# Patient Record
Sex: MALE | Race: WHITE | NOT HISPANIC OR LATINO | Employment: FULL TIME | URBAN - METROPOLITAN AREA
[De-identification: names, ages, dates, MRNs, and addresses within clinical notes are randomized per-mention and may not be internally consistent; named-entity substitution may affect disease eponyms.]

---

## 2018-09-10 ENCOUNTER — OFFICE VISIT (OUTPATIENT)
Dept: URGENT CARE | Facility: CLINIC | Age: 53
End: 2018-09-10
Payer: COMMERCIAL

## 2018-09-10 VITALS
SYSTOLIC BLOOD PRESSURE: 108 MMHG | RESPIRATION RATE: 16 BRPM | OXYGEN SATURATION: 98 % | BODY MASS INDEX: 27.97 KG/M2 | TEMPERATURE: 97.8 F | HEART RATE: 87 BPM | HEIGHT: 67 IN | WEIGHT: 178.2 LBS | DIASTOLIC BLOOD PRESSURE: 60 MMHG

## 2018-09-10 DIAGNOSIS — S61.411A LACERATION OF RIGHT HAND WITHOUT FOREIGN BODY, INITIAL ENCOUNTER: Primary | ICD-10-CM

## 2018-09-10 PROCEDURE — 90471 IMMUNIZATION ADMIN: CPT

## 2018-09-10 PROCEDURE — 12002 RPR S/N/AX/GEN/TRNK2.6-7.5CM: CPT | Performed by: PHYSICIAN ASSISTANT

## 2018-09-10 PROCEDURE — 99214 OFFICE O/P EST MOD 30 MIN: CPT | Performed by: PHYSICIAN ASSISTANT

## 2018-09-10 PROCEDURE — 12020 TX SUPFC WND DEHSN SMPL CLSR: CPT | Performed by: PHYSICIAN ASSISTANT

## 2018-09-10 PROCEDURE — 90715 TDAP VACCINE 7 YRS/> IM: CPT

## 2018-09-10 RX ORDER — ASPIRIN 81 MG/1
81 TABLET ORAL DAILY
COMMUNITY

## 2018-09-10 RX ORDER — ATORVASTATIN CALCIUM 40 MG/1
40 TABLET, FILM COATED ORAL DAILY
COMMUNITY

## 2018-09-10 NOTE — PROGRESS NOTES
[facility]        NAME: Alexx Chowdary is a 48 y o  male  : 1965    MRN: [id number]  DATE: September 10, 2018  TIME: 6:33 PM    Assessment and Plan   Laceration of right hand without foreign body, initial encounter [S61 411A]  1  Laceration of right hand without foreign body, initial encounter  TDAP Vaccine greater than or equal to 6yo    Laceration repair     Patient Instructions   Keep skin clean using soap and water  Pat dry  Apply an antibiotic ointment such as Neosporin as desired  Keep the wound covered while performing dirty activities and working  When at home and in a clean environment you may leave open to dry  Monitor for signs of infection including increasing redness, swelling, pus drainage, streaking redness, fever, chills, and body aches  If the symptoms occur seek care immediately  Follow-up in 7-10 days to have sutures removed  Proceed to the ER if symptoms worsen  Care for sutures reviewed in length  All questions answered  Precautions given  Chief Complaint     Chief Complaint   Patient presents with    Laceration     Pt here for laceration to right hand,  pt states it happened 1 hour ago  Pt cut  it  with a Utility knife  Pt states last TD is unknown  History of Present Illness       79-year-old male presenting with complaint of right hand laceration x today  Patient notes laceration occurred roughly 2 hours ago when he accidentally cut himself with a utility knife  He states he washed the wound immediately using Betadine solution that he had at home  He did apply direct pressure and believes bleeding has stopped  He denies seeing any tendon involvement, noting ability to move all of his fingers  He denies any decreased sensation at the fingertips  He has had multiple lacerations in the past, none to this area  He notes he has attempted to suture himself in the past, but was unable to do so given location of this injury    He is unsure of the date of his last tetanus  He is otherwise feeling well with no fevers, chills, body aches, or recent infections  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, however, does a large amount of dirty activities at home  Review of Systems   Review of Systems   Constitutional: Negative for appetite change, chills, diaphoresis, fatigue and fever  Respiratory: Negative for cough, shortness of breath and wheezing  Cardiovascular: Negative for chest pain and palpitations  Gastrointestinal: Negative for abdominal pain, diarrhea, nausea and vomiting  Musculoskeletal: Negative for arthralgias, joint swelling and myalgias  Skin: Positive for wound  Negative for color change and rash  Neurological: Negative for dizziness, weakness, light-headedness, numbness and headaches  Current Medications       Current Outpatient Prescriptions:     aspirin (ECOTRIN LOW STRENGTH) 81 mg EC tablet, Take 81 mg by mouth daily, Disp: , Rfl:     atorvastatin (LIPITOR) 40 mg tablet, Take 40 mg by mouth daily, Disp: , Rfl:     Current Allergies     Allergies as of 09/10/2018 - Reviewed 09/10/2018   Allergen Reaction Noted    Penicillins Other (See Comments) 09/10/2018            The following portions of the patient's history were reviewed and updated as appropriate: allergies, current medications, past family history, past medical history, past social history, past surgical history and problem list      Past Medical History:   Diagnosis Date    Hyperlipidemia        Past Surgical History:   Procedure Laterality Date    CORONARY ANGIOPLASTY      KNEE SURGERY      both knees       Family History   Problem Relation Age of Onset    Cancer Mother     Cancer Father      Medications have been verified      Objective   /60 (BP Location: Right arm, Patient Position: Sitting, Cuff Size: Large)   Pulse 87   Temp 97 8 °F (36 6 °C) (Tympanic)   Resp 16   Ht 5' 7" (1 702 m)   Wt 80 8 kg (178 lb 3 2 oz)   SpO2 98%   BMI 27 91 kg/m²      Physical Exam     Physical Exam   Constitutional: He is oriented to person, place, and time  He appears well-developed and well-nourished  No distress  HENT:   Head: Normocephalic and atraumatic  Eyes: Conjunctivae are normal    Cardiovascular: Normal rate, regular rhythm and normal heart sounds  Pulmonary/Chest: Effort normal and breath sounds normal  No respiratory distress  He has no decreased breath sounds  He has no wheezes  He has no rhonchi  He has no rales  Musculoskeletal: Normal range of motion  Neurological: He is alert and oriented to person, place, and time  He has normal reflexes  No cranial nerve deficit  He exhibits normal muscle tone  Coordination normal    Skin: Skin is warm and dry  No rash noted  He is not diaphoretic    3 centimeter linear laceration extending horizontally across the right palm  Laceration extends into the subcutaneous fat, however, no tendon, nerve, or vascular involvement  Full range of motion of all digits of the hand  Distal sensation intact throughout all digits   strength 5/5  Surrounding skin appears clean and dry  No signs of secondary infection of the skin  Psychiatric: He has a normal mood and affect  His behavior is normal    Nursing note and vitals reviewed  Laceration repair  Date/Time: 9/10/2018 5:00 PM  Performed by: Annelise Glover  Authorized by: Annelise Glover   Consent: Verbal consent obtained    Risks and benefits: risks, benefits and alternatives were discussed  Consent given by: patient  Patient understanding: patient states understanding of the procedure being performed  Patient identity confirmed: verbally with patient  Body area: upper extremity  Location details: right hand  Laceration length: 3 cm  Foreign bodies: no foreign bodies  Tendon involvement: none  Nerve involvement: none  Vascular damage: no    Anesthesia:  Local Anesthetic: topical anesthetic (Patient refused injectable medication, noting he is intolerant of burning sensation during administration  )    Sedation:  Patient sedated: no    Wound Dehiscence:  Superficial Wound Dehiscence: simple closure      Procedure Details:  Preparation: Patient was prepped and draped in the usual sterile fashion  Irrigation solution: saline  Irrigation method: syringe  Amount of cleaning: standard  Debridement: none  Degree of undermining: none  Skin closure: 4-0 nylon  Number of sutures: 3  Technique: simple  Approximation: close  Approximation difficulty: simple  Dressing: Bandage  Patient tolerance: Patient tolerated the procedure well with no immediate complications  Comments: Patient remained comfortable throughout exam   Anesthetic was offered throughout the exam, and declined each time

## 2018-09-10 NOTE — PATIENT INSTRUCTIONS
Keep skin clean using soap and water  Pat dry  Apply an antibiotic ointment such as Neosporin as desired  Keep the wound covered while performing dirty activities and working  When at home and in a clean environment you may leave open to dry  Monitor for signs of infection including increasing redness, swelling, pus drainage, streaking redness, fever, chills, and body aches  If the symptoms occur seek care immediately  Follow-up in 7-10 days to have sutures removed  Proceed to the ER if symptoms worsen  Care For Your Stitches   WHAT YOU NEED TO KNOW:   Stitches, or sutures, are used to close cuts and wounds on the skin  Stitches need to be removed after your wound has healed  DISCHARGE INSTRUCTIONS:   Return to the emergency department if:   · Your stitches come apart  · Blood soaks through your bandages  · You suddenly cannot move your injured joint  · You have sudden numbness around your wound  · You see red streaks coming from your wound  Contact your healthcare provider if:   · You have a fever and chills  · Your wound is red, warm, swollen, or leaking pus  · There is a bad smell coming from your wound  · You have increased pain in the wound area  · You have questions or concerns about your condition or care  Care for your stitches:  Keep your stitches clean and dry  You may need to cover your stitches with a bandage for 24 to 48 hours, or as directed  Do not bump or hit the suture area, as this could open the wound  Do not trim or shorten the ends of your stitches  If they rub on your clothing, put a gauze bandage between the stitches and your clothes  Clean your wound:  Carefully wash your wound with soap and water  For mouth and lip wounds, rinse your mouth after meals and at bedtime  Ask your healthcare provider what to use to rinse your mouth  If you have a scalp wound, you may gently wash your hair every 2 days with mild shampoo   Do not use hair products, such as hair spray  Help your wound heal:   · Elevate  your wound above the level of your heart as often as you can  This will help decrease swelling and pain  Prop your wound on pillows or blankets to keep it elevated comfortably  · Limit activity  Do not stretch the skin around your wound  This will help prevent bleeding and swelling of the wound area  Follow up with your healthcare provider as directed: You may need to return to have your stitches removed  Write down your questions so you remember to ask them during your visits  © 2017 [employer] Information is for End User's use only and may not be sold, redistributed or otherwise used for commercial purposes  All illustrations and images included in CareNotes® are the copyrighted property of [employer]  or Cristopher Florian  The above information is an  only  It is not intended as medical advice for individual conditions or treatments  Talk to your doctor, nurse or pharmacist before following any medical regimen to see if it is safe and effective for you